# Patient Record
Sex: FEMALE | Race: WHITE | NOT HISPANIC OR LATINO | URBAN - METROPOLITAN AREA
[De-identification: names, ages, dates, MRNs, and addresses within clinical notes are randomized per-mention and may not be internally consistent; named-entity substitution may affect disease eponyms.]

---

## 2018-09-06 ENCOUNTER — OFFICE VISIT - HEALTHEAST (OUTPATIENT)
Dept: FAMILY MEDICINE | Facility: CLINIC | Age: 23
End: 2018-09-06

## 2018-09-06 DIAGNOSIS — H00.011 HORDEOLUM EXTERNUM OF RIGHT UPPER EYELID: ICD-10-CM

## 2018-09-06 DIAGNOSIS — B00.9 HSV-1 (HERPES SIMPLEX VIRUS 1) INFECTION: ICD-10-CM

## 2018-09-06 DIAGNOSIS — J30.2 SEASONAL ALLERGIC RHINITIS: ICD-10-CM

## 2018-09-06 ASSESSMENT — MIFFLIN-ST. JEOR: SCORE: 1308.95

## 2021-06-02 VITALS — HEIGHT: 63 IN | WEIGHT: 131.19 LBS | BODY MASS INDEX: 23.25 KG/M2

## 2021-06-16 PROBLEM — J30.2 SEASONAL ALLERGIC RHINITIS: Status: ACTIVE | Noted: 2018-09-06

## 2021-06-16 PROBLEM — B00.9 HSV-1 (HERPES SIMPLEX VIRUS 1) INFECTION: Status: ACTIVE | Noted: 2018-09-06

## 2021-06-20 NOTE — PROGRESS NOTES
"Assessment / Impression     1. Hordeolum externum of right upper eyelid     2. HSV-1 (herpes simplex virus 1) infection     3. Seasonal allergic rhinitis           Plan:     I recommended warm compresses for the stye on her right upper eyelid.  It appears that this is already healing well on its own.  She may continue to monitor this for now.  She was given a prescription for valacyclovir to be used as needed for cold sore outbreaks.  She has cetirizine available as needed for allergies.    Subjective:      HPI: Misty Selby is a 23 y.o. female who resents to the clinic be evaluated for a red bump on her right upper eyelid.  She first noticed this a week ago and it improved by the next day.  Yesterday she noticed a bump again and it appears much smaller today.  She denies having any difficulty with vision.  She denies eye discharge.  She has a history of cold sores in has a new one that developed yesterday in her right lower lip.  She describes getting cold sores every few weeks.  She admits that she has been under more stress since moving to [place] a couple of months ago.  She also has a history of seasonal allergies and takes cetirizine as needed.    Social history: Recently moved to [place] from [place].  She works at [employer].    Review of Systems  All other systems reviewed and are negative.     History   Smoking Status     Never Smoker   Smokeless Tobacco     Never Used       Family History   Problem Relation Age of Onset     Drug abuse Neg Hx      Heart disease Neg Hx      Stroke Neg Hx      Hypertension Neg Hx        Objective:     /70 (Patient Site: Left Arm, Patient Position: Sitting, Cuff Size: Adult Regular)  Pulse 100  Temp 98.3  F (36.8  C) (Oral)   Resp 16  Ht 5' 3.3\" (1.608 m)  Wt 131 lb 3 oz (59.5 kg)  LMP 08/30/2018 (Exact Date)  Breastfeeding? No  BMI 23.02 kg/m2  Physical Examination: General appearance - alert, well appearing, and in no distress  Eyes: pupils equal and " reactive, extraocular eye movements intact.  There is a small circular area of trace erythema on the right eyelid that is slightly elevated.  Mouth: mucous membranes moist, pharynx normal without lesions.  Skin: There are a few tiny vesicular lesions of the right lower lip  Neck: supple, no significant adenopathy  Lungs: clear to auscultation, no wheezes, rales or rhonchi, symmetric air entry  Heart: normal rate, regular rhythm, normal S1, S2, no murmurs, rubs, clicks or gallops  Neurological: alert, oriented, normal speech, no focal findings or movement disorder noted.    Psychiatric: Normal affect. Does not appear anxious or depressed.    No results found for this or any previous visit (from the past 168 hour(s)).    Current Outpatient Prescriptions   Medication Sig     cetirizine 10 mg cap Take by mouth daily.     multivitamin (MULTIPLE VITAMIN ESSENTIAL ORAL) Take by mouth daily.     norgestimate-ethinyl estradiol (PREVIFEM ORAL) Take by mouth daily.     valACYclovir (VALTREX) 1000 MG tablet Take 2 tablets (2,000 mg total) by mouth 2 (two) times a day for 2 days.